# Patient Record
Sex: MALE | Race: WHITE | NOT HISPANIC OR LATINO | Employment: OTHER | URBAN - NONMETROPOLITAN AREA
[De-identification: names, ages, dates, MRNs, and addresses within clinical notes are randomized per-mention and may not be internally consistent; named-entity substitution may affect disease eponyms.]

---

## 2017-01-23 ENCOUNTER — OFFICE VISIT (OUTPATIENT)
Dept: GASTROENTEROLOGY | Age: 62
End: 2017-01-23

## 2017-01-23 ENCOUNTER — TELEPHONE (OUTPATIENT)
Dept: INTERNAL MEDICINE | Age: 62
End: 2017-01-23

## 2017-01-23 VITALS
WEIGHT: 150.6 LBS | HEART RATE: 76 BPM | BODY MASS INDEX: 24.2 KG/M2 | DIASTOLIC BLOOD PRESSURE: 70 MMHG | SYSTOLIC BLOOD PRESSURE: 140 MMHG | HEIGHT: 66 IN

## 2017-01-23 DIAGNOSIS — Z01.812 PRE-PROCEDURAL LABORATORY EXAMINATION: ICD-10-CM

## 2017-01-23 DIAGNOSIS — Z86.010 HISTORY OF COLON POLYPS: ICD-10-CM

## 2017-01-23 DIAGNOSIS — K62.5 RECTAL BLEEDING: ICD-10-CM

## 2017-01-23 DIAGNOSIS — K59.00 CONSTIPATION, UNSPECIFIED CONSTIPATION TYPE: Primary | ICD-10-CM

## 2017-01-23 DIAGNOSIS — Z01.810 PRE-PROCEDURAL CARDIOVASCULAR EXAMINATION: ICD-10-CM

## 2017-01-23 PROCEDURE — 99243 OFF/OP CNSLTJ NEW/EST LOW 30: CPT | Performed by: INTERNAL MEDICINE

## 2017-01-23 RX ORDER — 0.9 % SODIUM CHLORIDE 0.9 %
2 VIAL (ML) INJECTION EVERY 12 HOURS SCHEDULED
Status: CANCELLED | OUTPATIENT
Start: 2017-01-23

## 2017-01-23 RX ORDER — 0.9 % SODIUM CHLORIDE 0.9 %
2 VIAL (ML) INJECTION PRN
Status: CANCELLED | OUTPATIENT
Start: 2017-01-23

## 2017-01-23 RX ORDER — ONDANSETRON 2 MG/ML
4 INJECTION INTRAMUSCULAR; INTRAVENOUS 2 TIMES DAILY PRN
Status: CANCELLED | OUTPATIENT
Start: 2017-01-23

## 2017-01-23 RX ORDER — POLYETHYLENE GLYCOL 3350, SODIUM CHLORIDE, SODIUM BICARBONATE, POTASSIUM CHLORIDE 420; 11.2; 5.72; 1.48 G/4L; G/4L; G/4L; G/4L
POWDER, FOR SOLUTION ORAL
Qty: 4000 ML | Refills: 0 | Status: SHIPPED | OUTPATIENT
Start: 2017-01-23

## 2017-01-23 RX ORDER — SODIUM CHLORIDE 9 MG/ML
INJECTION, SOLUTION INTRAVENOUS CONTINUOUS
Status: CANCELLED | OUTPATIENT
Start: 2017-01-23

## 2017-02-01 ENCOUNTER — TELEPHONE (OUTPATIENT)
Dept: GASTROENTEROLOGY | Age: 62
End: 2017-02-01

## 2017-02-24 ENCOUNTER — OFF PREMISE (OUTPATIENT)
Dept: OTHER | Age: 62
End: 2017-02-24

## 2018-04-17 ENCOUNTER — OFF PREMISE (OUTPATIENT)
Dept: OTHER | Age: 63
End: 2018-04-17

## 2018-04-20 LAB — PATHOLOGY ICD PATIENT: NORMAL

## 2019-05-16 ENCOUNTER — IMPORTED ENCOUNTER (OUTPATIENT)
Dept: URBAN - METROPOLITAN AREA CLINIC 31 | Facility: CLINIC | Age: 64
End: 2019-05-16

## 2019-05-16 PROBLEM — Z96.1: Noted: 2019-05-16

## 2019-05-16 PROBLEM — H43.813: Noted: 2019-05-16

## 2019-05-16 PROBLEM — H01.022: Noted: 2019-05-16

## 2019-05-16 PROBLEM — H25.811: Noted: 2019-05-16

## 2019-05-16 PROBLEM — H01.001: Noted: 2019-05-16

## 2019-05-16 PROBLEM — H01.005: Noted: 2019-05-16

## 2019-05-16 PROBLEM — H01.002: Noted: 2019-05-16

## 2019-05-16 PROBLEM — H01.004: Noted: 2019-05-16

## 2019-05-16 PROCEDURE — 99244 OFF/OP CNSLTJ NEW/EST MOD 40: CPT

## 2019-05-16 NOTE — PATIENT DISCUSSION
Blepharitis anterior type OU - The patient exhibits reddened crusty eyelid margins. Warm compresses and lid scrubs were recommended. Antibiotic felicia to lid margin qhs. Artificial Tears to be used as needed for discomfort.

## 2019-05-16 NOTE — PATIENT DISCUSSION
Blepharitis anterior type OU - The patient exhibits reddened crusty eyelid margins. Warm compresses and lid scrubs were recommended.

## 2019-05-16 NOTE — PATIENT DISCUSSION
1.  1.  Cataract OD: Discussed the risks benefits alternatives and limitations of cataract surgery. The patient stated a full understanding and a desire to proceed with the procedure. A medical clearance form was given to the patient to bring to their PCP. The patient will need cataract measurements and to have any additional questions answered and start pre-operative eye drops as directed. Schedule KPE/IOL ODH/O LASIK2. Pseudophakia OS - Monitor3. PVD OU:  Patient was cautioned to call our office immediately if they experience a substantial change in their symptoms such as an increase in floaters persistent flashes loss of visual field (may appear as a shadow or a curtain) or decrease in visual acuity as these may indicate a retinal tear or detachment. If this is a new problem patient will need to return for re-examination  as determined by the [address] [place] [address] 10. Blepharitis anterior type OU - The patient exhibits reddened crusty eyelid margins. Start Erythromycin felicia ou BID prior to surgery.

## 2019-05-20 ENCOUNTER — IMPORTED ENCOUNTER (OUTPATIENT)
Dept: URBAN - METROPOLITAN AREA CLINIC 31 | Facility: CLINIC | Age: 64
End: 2019-05-20

## 2019-05-20 PROBLEM — H25.811: Noted: 2019-05-20

## 2019-05-20 PROCEDURE — 92025 CPTRIZED CORNEAL TOPOGRAPHY: CPT

## 2019-05-20 PROCEDURE — 76519 ECHO EXAM OF EYE: CPT

## 2019-06-04 PROBLEM — H01.005: Noted: 2019-06-04

## 2019-06-04 PROBLEM — H01.001: Noted: 2019-06-04

## 2019-06-04 PROBLEM — H01.004: Noted: 2019-06-04

## 2019-06-04 PROBLEM — H43.813: Noted: 2019-06-04

## 2019-06-04 PROBLEM — H01.002: Noted: 2019-06-04

## 2019-06-04 PROBLEM — Z96.1: Noted: 2019-06-04

## 2019-06-04 PROBLEM — H25.811: Noted: 2019-06-04

## 2019-08-21 ENCOUNTER — IMPORTED ENCOUNTER (OUTPATIENT)
Dept: URBAN - METROPOLITAN AREA CLINIC 31 | Facility: CLINIC | Age: 64
End: 2019-08-21

## 2019-08-21 PROBLEM — H25.811: Noted: 2019-08-21

## 2019-08-21 PROCEDURE — 99213 OFFICE O/P EST LOW 20 MIN: CPT

## 2019-08-21 NOTE — PATIENT DISCUSSION
Combined Types of Cataract OD: Discussed the risks benefits alternatives and limitations of cataract surgery including infection bleeding loss of vision retinal tears detachment. The patient stated a full understanding and a desire to proceed with the procedure in the right eye. Refractive options were reviewed. Patient has elected to be optimized for distance vision in the right eye. The patient will still need glasses for reading and to possibly fine tune distance vision. Discussed in depth again  in addition to standard comlications the increased likelyhood of refractive surprise and a potential need for full or part time glasses post surgery or the need for IOL exchange if IOL power is significantly wrong post surgery

## 2019-08-27 ENCOUNTER — IMPORTED ENCOUNTER (OUTPATIENT)
Dept: URBAN - METROPOLITAN AREA CLINIC 31 | Facility: CLINIC | Age: 64
End: 2019-08-27

## 2019-08-27 PROBLEM — Z96.1: Noted: 2019-08-27

## 2019-08-27 PROCEDURE — 99024 POSTOP FOLLOW-UP VISIT: CPT

## 2019-08-30 ENCOUNTER — IMPORTED ENCOUNTER (OUTPATIENT)
Dept: URBAN - METROPOLITAN AREA CLINIC 31 | Facility: CLINIC | Age: 64
End: 2019-08-30

## 2019-08-30 PROCEDURE — 99024 POSTOP FOLLOW-UP VISIT: CPT

## 2019-09-19 ENCOUNTER — IMPORTED ENCOUNTER (OUTPATIENT)
Dept: URBAN - METROPOLITAN AREA CLINIC 31 | Facility: CLINIC | Age: 64
End: 2019-09-19

## 2019-09-19 PROBLEM — Z96.1: Noted: 2019-09-19

## 2019-09-19 PROCEDURE — 99024 POSTOP FOLLOW-UP VISIT: CPT

## 2019-10-10 ENCOUNTER — IMPORTED ENCOUNTER (OUTPATIENT)
Dept: URBAN - METROPOLITAN AREA CLINIC 31 | Facility: CLINIC | Age: 64
End: 2019-10-10

## 2019-10-10 PROBLEM — Z96.1: Noted: 2019-10-10

## 2019-10-10 PROCEDURE — 99024 POSTOP FOLLOW-UP VISIT: CPT

## 2019-10-10 NOTE — PATIENT DISCUSSION
1.  Post-Op Cataract Surgery 15-90 days Right Eye (OD):  Doing well with stable vision. Final glasses rx given today. 2. Pseudophakia OS - IOL stable. Monitor. Return for an appointment in 4 months for dilated fundus exam. with Dr. Bhanu Wan.

## 2020-02-10 ENCOUNTER — IMPORTED ENCOUNTER (OUTPATIENT)
Dept: URBAN - METROPOLITAN AREA CLINIC 31 | Facility: CLINIC | Age: 65
End: 2020-02-10

## 2020-02-10 PROBLEM — Z96.1: Noted: 2020-02-10

## 2020-02-10 PROCEDURE — 99214 OFFICE O/P EST MOD 30 MIN: CPT

## 2020-02-10 NOTE — PATIENT DISCUSSION
Pseudophakia OU - IOLs stable. Monitorfor changes in vision. Return for an appointment in 1 year for comprehensive exam. with Dr. Dolly Aguirre.

## 2020-11-16 NOTE — PATIENT DISCUSSION
PT IS ALLERGIC TO ANESTESIA (IV BENZOS). PT CANNOT TAKE. PLEASE SEDATE WITH PROPOPHAL  and BLOCK OS.

## 2021-01-08 NOTE — PATIENT DISCUSSION
Problem: Infection  Goal: Will remain free from infection  Outcome: PROGRESSING AS EXPECTED  Scrotal drainage is sero-sanguinous, no purulence nor smell noted to drainage.    Problem: Knowledge Deficit  Goal: Knowledge of the prescribed therapeutic regimen will improve  Outcome: PROGRESSING AS EXPECTED  Change of antibiotics today, patient declined handout regarding new med Zosyn.       Recommend warm compresses and artificial tears.

## 2022-04-01 ASSESSMENT — VISUAL ACUITY
OD_SC: 20/50-1
OD_CC: 20/70
OS_CC: 20/20
OS_CC: 20/20
OD_SC: J3
OS_SC: J7
OD_CC: 20/25-2
OS_CC: 20/20-1
OD_CC: 20/30-2
OD_CC: 20/20
OS_CC: 20/20-1
OD_CC: 20/30+1
OS_SC: 20/20
OD_SC: 20/50-2
OS_SC: 20/20
OS_CC: 20/25+1
OD_CC: J1
OS_CC: 20/20-2
OS_CC: J1+
OD_CC: 20/40-2
OD_PH: SC 20/30 -1

## 2022-04-01 ASSESSMENT — TONOMETRY
OD_IOP_MMHG: 15
OD_IOP_MMHG: 11
OS_IOP_MMHG: 14
OD_IOP_MMHG: 11
OD_IOP_MMHG: 10
OD_IOP_MMHG: 13
OS_IOP_MMHG: 13
OS_IOP_MMHG: 15
OS_IOP_MMHG: 13
OD_IOP_MMHG: 12